# Patient Record
Sex: MALE | Employment: FULL TIME
[De-identification: names, ages, dates, MRNs, and addresses within clinical notes are randomized per-mention and may not be internally consistent; named-entity substitution may affect disease eponyms.]

---

## 2024-10-30 ENCOUNTER — DASHBOARD ENCOUNTERS (OUTPATIENT)
Age: 24
End: 2024-10-30

## 2024-10-30 ENCOUNTER — OFFICE VISIT (OUTPATIENT)
Dept: URBAN - METROPOLITAN AREA CLINIC 68 | Facility: CLINIC | Age: 24
End: 2024-10-30
Payer: COMMERCIAL

## 2024-10-30 ENCOUNTER — LAB OUTSIDE AN ENCOUNTER (OUTPATIENT)
Dept: URBAN - METROPOLITAN AREA CLINIC 68 | Facility: CLINIC | Age: 24
End: 2024-10-30

## 2024-10-30 VITALS — HEIGHT: 71 IN | WEIGHT: 218 LBS | BODY MASS INDEX: 30.52 KG/M2

## 2024-10-30 DIAGNOSIS — R10.11 RUQ PAIN: ICD-10-CM

## 2024-10-30 PROBLEM — 301717006: Status: ACTIVE | Noted: 2024-10-30

## 2024-10-30 PROCEDURE — 99204 OFFICE O/P NEW MOD 45 MIN: CPT | Performed by: INTERNAL MEDICINE

## 2024-10-30 NOTE — HPI-TODAY'S VISIT:
Case of a 24-year-old male patient that comes in today for evaluation.  Patient refers that for several months ago he has developed intermittent episodes of severe throbbing right upper quadrant abdominal pain.  He feels pain is precipitated by food ingestion.  When it happens it is severe in nature and usually lasts from minutes to hours.  He denies heartburn dyspepsia.  He denies nausea vomiting.  Denies melena hematochezia hematemesis coffee-ground emesis.  He comes in today for evaluation.